# Patient Record
Sex: FEMALE | URBAN - NONMETROPOLITAN AREA
[De-identification: names, ages, dates, MRNs, and addresses within clinical notes are randomized per-mention and may not be internally consistent; named-entity substitution may affect disease eponyms.]

---

## 2024-07-08 ENCOUNTER — TELEPHONE (OUTPATIENT)
Dept: PRIMARY CARE CLINIC | Age: 24
End: 2024-07-08

## 2024-07-08 NOTE — TELEPHONE ENCOUNTER
----- Message from Josefina Barger sent at 7/8/2024 12:45 PM EDT -----  Regarding: [facility] Appointment Request  [facility] Appointment Request    Patient needs appointment for [facility] Appointment Type: New Patient.    Patient Requested Dates(s): anytime after August 1, 2024 - preferrably Monday   Patient Requested Time: afternoon  Provider Name:    Reason for Appointment Request: New Patient - No appointments available during search    Additional Information : Patient wanted to establish care and pt insurance is [employer]  --------------------------------------------------------------------------------------------------------------------------    Relationship to Patient: Spouse/Partner : juan     Call Back Information: OK to leave message on voicemail  Preferred Call Back Number: Phone [phone]

## 2024-07-08 NOTE — TELEPHONE ENCOUNTER
Spoke with pt. She states she needs a Monday, and I informed her the only providers accepting new patients here are all off on Mondays.   I advised calling the [place] office to establish.